# Patient Record
Sex: MALE | Race: NATIVE HAWAIIAN OR OTHER PACIFIC ISLANDER | Employment: FULL TIME | URBAN - METROPOLITAN AREA
[De-identification: names, ages, dates, MRNs, and addresses within clinical notes are randomized per-mention and may not be internally consistent; named-entity substitution may affect disease eponyms.]

---

## 2024-02-03 ENCOUNTER — HOSPITAL ENCOUNTER (OUTPATIENT)
Dept: RADIOLOGY | Facility: EXTERNAL LOCATION | Age: 34
Discharge: HOME | End: 2024-02-03
Payer: COMMERCIAL

## 2024-02-03 DIAGNOSIS — M79.606 PAIN, LOWER EXTREMITY: ICD-10-CM

## 2024-02-05 ENCOUNTER — OFFICE VISIT (OUTPATIENT)
Dept: ORTHOPEDIC SURGERY | Facility: CLINIC | Age: 34
End: 2024-02-05
Payer: COMMERCIAL

## 2024-02-05 VITALS — HEIGHT: 69 IN | BODY MASS INDEX: 25.18 KG/M2 | WEIGHT: 170 LBS

## 2024-02-05 DIAGNOSIS — S82.401A: Primary | ICD-10-CM

## 2024-02-05 DIAGNOSIS — S82.201A: Primary | ICD-10-CM

## 2024-02-05 PROCEDURE — 1036F TOBACCO NON-USER: CPT | Performed by: FAMILY MEDICINE

## 2024-02-05 PROCEDURE — L4361 PNEUMA/VAC WALK BOOT PRE OTS: HCPCS | Performed by: FAMILY MEDICINE

## 2024-02-05 PROCEDURE — 27780 TREATMENT OF FIBULA FRACTURE: CPT | Performed by: FAMILY MEDICINE

## 2024-02-05 PROCEDURE — 99203 OFFICE O/P NEW LOW 30 MIN: CPT | Performed by: FAMILY MEDICINE

## 2024-02-05 NOTE — PROGRESS NOTES
History of Present Illness   Chief Complaint   Patient presents with    Right Lower Leg - Injury     Pt fell skiing 1/1/2023  Bent down and felt a pop 2/3/24 +pain       The patient is 33 y.o. male  here with a complaint of right lower leg injury.  Patient sustained initial injury little over 2 weeks ago while skiing, says that he fell going fast, tumbled, said that he had some pain in his mid lateral shin, was not able to continue skiing, pain improved over the next few days, says there was some mild bruising.  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  He says over the weekend he was working, was bending down when he felt a painful popping sensation in his leg.  He was seen in the urgent care, he was more concerned about a possible DVT but they did not have ultrasound there, they opted for a tib-fib x-ray which showed a minimally displaced comminuted fracture of the midshaft of the fibula, he was splinted and recommended outpatient follow-up.  He says that his pain is minimal today, no pain at rest, he can walk/bear weight, he has some pain with pushing off.  He has been taking over-the-counter medication sparingly for pain.  Patient is active, enjoys working out, skiing, plays volleyball    No past medical history on file.    Medication Documentation Review Audit       Reviewed by Chey Coronado MA (Medical Assistant) on 02/05/24 at 1341      Medication Order Taking? Sig Documenting Provider Last Dose Status            No Medications to Display                                   No Known Allergies    Social History     Socioeconomic History    Marital status: Single     Spouse name: Not on file    Number of children: Not on file    Years of education: Not on file    Highest education level: Not on file   Occupational History    Not on file   Tobacco Use     Smoking status: Never    Smokeless tobacco: Never   Substance and Sexual Activity    Alcohol use: Never    Drug use: Never    Sexual activity: Not on file   Other Topics Concern    Not on file   Social History Narrative    Not on file     Social Determinants of Health     Financial Resource Strain: Not on file   Food Insecurity: Not on file   Transportation Needs: Not on file   Physical Activity: Not on file   Stress: Not on file   Social Connections: Not on file   Intimate Partner Violence: Not on file   Housing Stability: Not on file       No past surgical history on file.       Review of Systems   GENERAL: Negative  GI: Negative  MUSCULOSKELETAL: See HPI  SKIN: Negative  NEURO:  Negative     Physical Exam:    General/Constitutional: well appearing, no distress, appears stated age  HEENT: sclera clear  Respiratory: non labored breathing  Vascular: No edema, swelling or tenderness, except as noted in detailed exam.  Integumentary: No impressive skin lesions present, except as noted in detailed exam.  Neurological:  Alert and oriented   Psychological:  Normal mood and affect.  Musculoskeletal: Normal, except as noted in detailed exam and in HPI.  Mildly antalgic gait with attempted weightbearing unassisted    Right lower leg: Normal appearance, there is no swelling, there is no ecchymosis, there is no significant tenderness to palpation at the midshaft of the fibula at the fracture site, no other areas of tenderness to palpation, at the ankle he is nontender at the syndesmosis, there is no swelling, there is no ecchymosis, has good range of motion of the ankle in all directions, there is no pain or weakness with strength testing at the ankle, negative syndesmotic squeeze, negative external rotation test, good range of motion at the knee       Imaging: X-rays of right tib-fib from 2/3/2024 dependently reviewed, there is a very minimally displaced comminuted midshaft fracture of the fibula, distally the ankle mortise  is intact, no other abnormalities are seen      Assessment   1. Closed fracture of tibia and fibula, shaft, right, initial encounter          Closed fibula midshaft fracture, appears to be isolated fracture, no signs of any concurrent ankle injury, likely injury occurred 2 weeks ago after ski incident with displacement occurring over the weekend, he is minimally tender to palpation at fracture site    Plan: Discussed diagnosis, reviewed x-rays, treatment options with patient.  We stated that isolated fibular shaft fractures are treated conservatively and doing welltypically treated with comfort care measures, I did provide him with a boot that he can wear as needed for pain but is not necessary for fracture healing, I did instruct him to avoid high impact, other aggravating activities over the next 3 weeks, he will follow-up in 3 weeks for reassessment with repeat x-rays of the right tib-fib

## 2024-02-05 NOTE — LETTER
February 5, 2024     Patient: Niko Upton   YOB: 1990   Date of Visit: 2/5/2024       To Whom It May Concern:    Niko Upton was seen for his right fibula fracture. It is my medical opinion that he may return to full duty immediately with no restrictions. He can weight bear on right lower extremity.      If you have any questions or concerns, please don't hesitate to call.         Sincerely,        Juan Tabares MD    CC: No Recipients

## 2024-03-26 ENCOUNTER — HOSPITAL ENCOUNTER (OUTPATIENT)
Dept: RADIOLOGY | Facility: CLINIC | Age: 34
Discharge: HOME | End: 2024-03-26
Payer: COMMERCIAL

## 2024-03-26 ENCOUNTER — OFFICE VISIT (OUTPATIENT)
Dept: ORTHOPEDIC SURGERY | Facility: CLINIC | Age: 34
End: 2024-03-26
Payer: COMMERCIAL

## 2024-03-26 DIAGNOSIS — S82.424D: Primary | ICD-10-CM

## 2024-03-26 DIAGNOSIS — S82.401A: ICD-10-CM

## 2024-03-26 DIAGNOSIS — S82.201A: ICD-10-CM

## 2024-03-26 PROCEDURE — 73590 X-RAY EXAM OF LOWER LEG: CPT | Mod: RT

## 2024-03-26 PROCEDURE — 1036F TOBACCO NON-USER: CPT | Performed by: FAMILY MEDICINE

## 2024-03-26 PROCEDURE — 73590 X-RAY EXAM OF LOWER LEG: CPT | Mod: RIGHT SIDE | Performed by: RADIOLOGY

## 2024-03-26 PROCEDURE — 99024 POSTOP FOLLOW-UP VISIT: CPT | Performed by: FAMILY MEDICINE

## 2024-03-26 NOTE — PROGRESS NOTES
History of Present Illness   Chief Complaint   Patient presents with    Right Lower Leg - Follow-up     Tibia fibula fracture       The patient is 33 y.o. male  here for follow-up of right midshaft fibula fracture.  Patient is little over 2 months out from injury, see previous note for full details.  Deemed to be isolated midshaft fracture based on x-ray and exam findings.  Recommended conservative management, he was provided a boot for comfort which she did wear for a few weeks, says he was able to wean himself out of the boot and has been doing well, he denies any pain, some tightness into the lower shin and ankle at times.  I did recommend follow-up in 3 weeks but says that he was busy with work and was not able to come in however he would like to return back to high levels of activity and thought he should be evaluated with repeat x-rays to assess healing before he returned to higher level activity including beach volleyball and weightlifting in the gym          No past medical history on file.    Medication Documentation Review Audit       Reviewed by Juan Tabares MD (Physician) on 02/05/24 at 1407      Medication Order Taking? Sig Documenting Provider Last Dose Status            No Medications to Display                                   No Known Allergies    Social History     Socioeconomic History    Marital status: Single     Spouse name: Not on file    Number of children: Not on file    Years of education: Not on file    Highest education level: Not on file   Occupational History    Not on file   Tobacco Use    Smoking status: Never    Smokeless tobacco: Never   Substance and Sexual Activity    Alcohol use: Never    Drug use: Never    Sexual activity: Not on file   Other Topics Concern    Not on file   Social History Narrative    Not on file     Social Determinants of Health     Financial Resource Strain: Not on file   Food Insecurity: Not on file   Transportation Needs: Not on file   Physical Activity:  Not on file   Stress: Not on file   Social Connections: Not on file   Intimate Partner Violence: Not on file   Housing Stability: Not on file       No past surgical history on file.       Review of Systems   GENERAL: Negative  GI: Negative  MUSCULOSKELETAL: See HPI  SKIN: Negative  NEURO:  Negative     Physical Exam:    General/Constitutional: well appearing, no distress, appears stated age  HEENT: sclera clear  Respiratory: non labored breathing  Vascular: No edema, swelling or tenderness, except as noted in detailed exam.  Integumentary: No impressive skin lesions present, except as noted in detailed exam.  Neurological:  Alert and oriented   Psychological:  Normal mood and affect.  Musculoskeletal: Normal, except as noted in detailed exam and in HPI.  Normal gait, unassisted    Right lower leg: Normal appearance, there is no swelling, there is no ecchymosis, there is no significant tenderness to palpation at the midshaft of the fibula at the fracture site, no other areas of tenderness to palpation, at the ankle he is nontender at the syndesmosis, there is no swelling, there is no ecchymosis, has good range of motion of the ankle in all directions, there is no pain or weakness with strength testing at the ankle, negative syndesmotic squeeze, negative external rotation test, good range of motion at the knee.  Patient can do a single-leg hop without pain at fracture site       Imaging: Repeat x-rays of right tib-fib obtained today and independently reviewed, there is evidence of a healing, minimally displaced midshaft fracture of the fibular shaft with extensive bridging callus formation    Assessment   1. Nondisplaced transverse fracture of shaft of right fibula, subsequent encounter for closed fracture with routine healing  XR tibia fibula right 2 views        Closed fibula midshaft fracture, 2 months out from injury, good clinical and radiographic evidence of healing    Plan: At this point patient is cleared to  begin gradual return to full activity as tolerated by symptoms including lower extremity weight training and volleyball activity.  He will follow-up as symptoms dictate.